# Patient Record
Sex: MALE | Race: WHITE
[De-identification: names, ages, dates, MRNs, and addresses within clinical notes are randomized per-mention and may not be internally consistent; named-entity substitution may affect disease eponyms.]

---

## 2019-03-01 ENCOUNTER — HOSPITAL ENCOUNTER (EMERGENCY)
Dept: HOSPITAL 4 - SED | Age: 19
Discharge: HOME | End: 2019-03-01
Payer: COMMERCIAL

## 2019-03-01 VITALS — BODY MASS INDEX: 17.93 KG/M2 | HEIGHT: 64 IN | WEIGHT: 105 LBS

## 2019-03-01 VITALS — SYSTOLIC BLOOD PRESSURE: 127 MMHG
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
## 2019-03-01 DIAGNOSIS — S02.69XA: Primary | ICD-10-CM

## 2022-10-23 ENCOUNTER — HOSPITAL ENCOUNTER (EMERGENCY)
Dept: HOSPITAL 4 - SED | Age: 22
Discharge: HOME | End: 2022-10-23
Payer: COMMERCIAL

## 2022-10-23 VITALS — SYSTOLIC BLOOD PRESSURE: 152 MMHG

## 2022-10-23 VITALS — BODY MASS INDEX: 22.58 KG/M2 | HEIGHT: 60 IN | WEIGHT: 115 LBS
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
## 2022-10-23 DIAGNOSIS — S43.402A: Primary | ICD-10-CM

## 2022-10-23 DIAGNOSIS — Z79.899: ICD-10-CM
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
## 2022-10-23 NOTE — NUR
Pt brought by self, A&Ox4, pt presents to ER with L shoulder pain after falling 
during hockey game, pt denies injuries, skin pink and warm, cap refill <3, VSS

## 2022-10-23 NOTE — NUR
Patient given written and verbal discharge instructions and verbalizes 
understanding.  ER MD discussed with patient the results and treatment 
provided. Patient in stable condition. ID arm band removed. 

Rx of  Ibuprofen given. Patient educated on pain management and to follow up 
with PMD. Pain Scale 3/10

Opportunity for questions provided and answered. Medication side effect fact 
sheet provided.